# Patient Record
Sex: MALE | Race: WHITE | NOT HISPANIC OR LATINO | URBAN - NONMETROPOLITAN AREA
[De-identification: names, ages, dates, MRNs, and addresses within clinical notes are randomized per-mention and may not be internally consistent; named-entity substitution may affect disease eponyms.]

---

## 2017-03-01 ENCOUNTER — NEW PATIENT (OUTPATIENT)
Dept: URBAN - NONMETROPOLITAN AREA CLINIC 3 | Facility: CLINIC | Age: 42
End: 2017-03-01
Payer: COMMERCIAL

## 2017-03-01 DIAGNOSIS — H52.03 HYPERMETROPIA, BILATERAL: Primary | ICD-10-CM

## 2017-03-01 PROCEDURE — 92004 COMPRE OPH EXAM NEW PT 1/>: CPT | Performed by: OPTOMETRIST

## 2017-03-01 PROCEDURE — 92015 DETERMINE REFRACTIVE STATE: CPT | Performed by: OPTOMETRIST

## 2017-03-01 ASSESSMENT — INTRAOCULAR PRESSURE
OD: 15
OS: 17

## 2017-03-01 ASSESSMENT — VISUAL ACUITY
OS: 20/20
OD: 20/20

## 2017-03-01 ASSESSMENT — KERATOMETRY
OS: 41.13
OD: 41.00

## 2018-04-05 ENCOUNTER — FOLLOW UP ESTABLISHED (OUTPATIENT)
Dept: URBAN - NONMETROPOLITAN AREA CLINIC 3 | Facility: CLINIC | Age: 43
End: 2018-04-05
Payer: COMMERCIAL

## 2018-04-05 PROCEDURE — 92014 COMPRE OPH EXAM EST PT 1/>: CPT | Performed by: OPTOMETRIST

## 2018-04-05 ASSESSMENT — KERATOMETRY
OS: 41.25
OD: 40.75

## 2018-04-05 ASSESSMENT — VISUAL ACUITY
OD: 20/20
OS: 20/20

## 2018-04-05 ASSESSMENT — INTRAOCULAR PRESSURE
OD: 17
OS: 17

## 2019-09-24 ENCOUNTER — FOLLOW UP ESTABLISHED (OUTPATIENT)
Dept: URBAN - NONMETROPOLITAN AREA CLINIC 3 | Facility: CLINIC | Age: 44
End: 2019-09-24
Payer: COMMERCIAL

## 2019-09-24 PROCEDURE — 92015 DETERMINE REFRACTIVE STATE: CPT | Performed by: OPTOMETRIST

## 2019-09-24 PROCEDURE — 92014 COMPRE OPH EXAM EST PT 1/>: CPT | Performed by: OPTOMETRIST

## 2019-09-24 ASSESSMENT — INTRAOCULAR PRESSURE
OD: 16
OS: 16

## 2019-09-24 ASSESSMENT — VISUAL ACUITY
OS: 20/20
OD: 20/20

## 2021-03-10 ENCOUNTER — FOLLOW UP ESTABLISHED (OUTPATIENT)
Dept: URBAN - NONMETROPOLITAN AREA CLINIC 3 | Facility: CLINIC | Age: 46
End: 2021-03-10
Payer: COMMERCIAL

## 2021-03-10 DIAGNOSIS — H52.4 PRESBYOPIA: ICD-10-CM

## 2021-03-10 DIAGNOSIS — H04.123 TEAR FILM INSUFFICIENCY OF BILATERAL LACRIMAL GLANDS: Primary | ICD-10-CM

## 2021-03-10 PROCEDURE — 99213 OFFICE O/P EST LOW 20 MIN: CPT | Performed by: OPTOMETRIST

## 2021-03-10 ASSESSMENT — VISUAL ACUITY
OD: 20/20
OS: 20/20

## 2021-03-10 ASSESSMENT — INTRAOCULAR PRESSURE
OD: 16
OS: 15

## 2022-09-29 ENCOUNTER — OFFICE VISIT (OUTPATIENT)
Dept: URBAN - METROPOLITAN AREA CLINIC 24 | Facility: CLINIC | Age: 47
End: 2022-09-29
Payer: COMMERCIAL

## 2022-09-29 DIAGNOSIS — H52.03 HYPERMETROPIA, BILATERAL: ICD-10-CM

## 2022-09-29 DIAGNOSIS — H04.123 TEAR FILM INSUFFICIENCY OF BILATERAL LACRIMAL GLANDS: Primary | ICD-10-CM

## 2022-09-29 PROCEDURE — 92014 COMPRE OPH EXAM EST PT 1/>: CPT | Performed by: STUDENT IN AN ORGANIZED HEALTH CARE EDUCATION/TRAINING PROGRAM

## 2022-09-29 ASSESSMENT — KERATOMETRY
OD: 41.06
OS: 41.38

## 2022-09-29 ASSESSMENT — INTRAOCULAR PRESSURE
OD: 17
OS: 18

## 2022-09-29 ASSESSMENT — VISUAL ACUITY
OS: 20/20
OD: 20/20

## 2022-09-29 NOTE — IMPRESSION/PLAN
Impression: Tear film insufficiency of bilateral lacrimal glands: H04.123. Plan: Dry eyes contribute to the patient's complaints. There is no evidence of permanent changes to the cornea. Explained condition does not have a cure, is a chronic condition and will need consistent artificial tears use for maintenance.  
Cont art tears up to qid prn OU